# Patient Record
Sex: MALE | Race: BLACK OR AFRICAN AMERICAN | NOT HISPANIC OR LATINO | URBAN - METROPOLITAN AREA
[De-identification: names, ages, dates, MRNs, and addresses within clinical notes are randomized per-mention and may not be internally consistent; named-entity substitution may affect disease eponyms.]

---

## 2019-03-28 ENCOUNTER — OFFICE VISIT (OUTPATIENT)
Dept: FAMILY MEDICINE | Facility: CLINIC | Age: 59
End: 2019-03-28
Payer: MEDICARE

## 2019-03-28 VITALS
WEIGHT: 165.81 LBS | BODY MASS INDEX: 24.56 KG/M2 | RESPIRATION RATE: 16 BRPM | DIASTOLIC BLOOD PRESSURE: 64 MMHG | SYSTOLIC BLOOD PRESSURE: 110 MMHG | OXYGEN SATURATION: 94 % | HEART RATE: 67 BPM | HEIGHT: 69 IN | TEMPERATURE: 97 F

## 2019-03-28 DIAGNOSIS — R11.2 NAUSEA VOMITING AND DIARRHEA: ICD-10-CM

## 2019-03-28 DIAGNOSIS — I10 HYPERTENSION, UNSPECIFIED TYPE: ICD-10-CM

## 2019-03-28 DIAGNOSIS — R73.9 HYPERGLYCEMIA: ICD-10-CM

## 2019-03-28 DIAGNOSIS — N18.6 END-STAGE RENAL DISEASE (ESRD): Primary | ICD-10-CM

## 2019-03-28 DIAGNOSIS — R63.4 UNINTENTIONAL WEIGHT LOSS: ICD-10-CM

## 2019-03-28 DIAGNOSIS — R19.7 NAUSEA VOMITING AND DIARRHEA: ICD-10-CM

## 2019-03-28 DIAGNOSIS — E78.5 HYPERLIPIDEMIA, UNSPECIFIED HYPERLIPIDEMIA TYPE: ICD-10-CM

## 2019-03-28 PROBLEM — I25.10 CORONARY ARTERIOSCLEROSIS: Status: ACTIVE | Noted: 2019-03-28

## 2019-03-28 PROCEDURE — 99203 OFFICE O/P NEW LOW 30 MIN: CPT | Mod: S$GLB,,, | Performed by: NURSE PRACTITIONER

## 2019-03-28 PROCEDURE — 99203 PR OFFICE/OUTPT VISIT, NEW, LEVL III, 30-44 MIN: ICD-10-PCS | Mod: S$GLB,,, | Performed by: NURSE PRACTITIONER

## 2019-03-28 RX ORDER — SEVELAMER CARBONATE 800 MG/1
5 TABLET, FILM COATED ORAL
COMMUNITY
End: 2020-02-04 | Stop reason: ALTCHOICE

## 2019-03-28 RX ORDER — ONDANSETRON 4 MG/1
1 TABLET, FILM COATED ORAL EVERY 8 HOURS PRN
Refills: 1 | COMMUNITY
Start: 2019-03-15

## 2019-03-28 RX ORDER — ESOMEPRAZOLE MAGNESIUM 40 MG/1
1 CAPSULE, DELAYED RELEASE ORAL DAILY
COMMUNITY
End: 2020-02-04 | Stop reason: ALTCHOICE

## 2019-03-28 RX ORDER — AMLODIPINE BESYLATE 5 MG/1
1 TABLET ORAL DAILY
Refills: 3 | COMMUNITY
Start: 2019-03-06 | End: 2020-02-04

## 2019-03-28 RX ORDER — HYDROCODONE BITARTRATE AND ACETAMINOPHEN 10; 325 MG/1; MG/1
1 TABLET ORAL 3 TIMES DAILY
Refills: 0 | COMMUNITY
Start: 2019-03-19

## 2019-03-28 RX ORDER — FERRIC CITRATE 210 MG/1
2 TABLET, COATED ORAL
Refills: 6 | COMMUNITY
Start: 2019-02-22 | End: 2020-02-04 | Stop reason: ALTCHOICE

## 2019-03-28 RX ORDER — ASPIRIN 81 MG/1
1 TABLET ORAL DAILY
COMMUNITY

## 2019-03-28 RX ORDER — ATORVASTATIN CALCIUM 40 MG/1
1 TABLET, FILM COATED ORAL DAILY
COMMUNITY
End: 2020-02-04 | Stop reason: SDUPTHER

## 2019-03-28 RX ORDER — PREGABALIN 150 MG/1
1 CAPSULE ORAL DAILY
Refills: 0 | COMMUNITY
Start: 2019-01-04

## 2019-03-28 RX ORDER — CARVEDILOL 12.5 MG/1
1 TABLET ORAL DAILY
Refills: 11 | COMMUNITY
Start: 2019-03-06 | End: 2020-02-04 | Stop reason: ALTCHOICE

## 2019-03-28 RX ORDER — LOSARTAN POTASSIUM 50 MG/1
1 TABLET ORAL DAILY
Refills: 11 | COMMUNITY
Start: 2019-01-10 | End: 2020-02-04 | Stop reason: ALTCHOICE

## 2019-03-28 NOTE — PROGRESS NOTES
Subjective:       Patient ID: Jair Fatima is a 58 y.o. male.    Chief Complaint: Establish Care (N/V, diarrhea, and weight loss.    HPI     Pt is here to [facility], no PCP in many years, does see Dr Bhatia, and [place] [facility] [place] about a year and a half ago. Surgeon was Dr Ray. HD MWF.     C/o unintentional weight loss--he was 83 kg two months ago, now 74 kg. He states that he does not feel bad, but doesn't have much appetite.  Pt also c/o a lot of nausea, and vomiting, and diarrhea--duration 6 months. Watery stools at least once wilkes. No dark, tarry stools.        Review of Systems   Constitutional: Positive for unexpected weight change. Negative for activity change, appetite change, chills, fatigue and fever.   Respiratory: Negative for apnea, cough, chest tightness, shortness of breath and wheezing.    Cardiovascular: Negative for chest pain, palpitations and leg swelling.   Gastrointestinal: Positive for diarrhea, nausea and vomiting. Negative for abdominal distention, abdominal pain, anal bleeding, blood in stool, constipation and rectal pain.   Genitourinary: Negative for difficulty urinating, dysuria, flank pain, frequency and urgency.   Musculoskeletal: Negative for arthralgias, back pain, gait problem, joint swelling and neck pain.   Skin: Negative for color change, pallor and rash.   Neurological: Negative for dizziness, tremors, syncope, weakness, light-headedness and numbness.   Hematological: Negative for adenopathy. Does not bruise/bleed easily.   Psychiatric/Behavioral: Negative for agitation, confusion and sleep disturbance. The patient is not nervous/anxious.        Objective:      Physical Exam   Constitutional: He is oriented to person, place, and time. Vital signs are normal. He appears well-developed and well-nourished.   HENT:   Head: Normocephalic and atraumatic.   Mouth/Throat: Oropharynx is clear and moist.   Eyes: Pupils are equal, round,  and reactive to light. Conjunctivae are normal.   Neck: Trachea normal and normal range of motion. Neck supple. Carotid bruit is not present.   Cardiovascular: Normal rate, regular rhythm and normal heart sounds.   Pulmonary/Chest: Effort normal and breath sounds normal.   Abdominal: Soft. Bowel sounds are normal.   Musculoskeletal: Normal range of motion.   Neurological: He is alert and oriented to person, place, and time.   Skin: Skin is warm, dry and intact.   Psychiatric: He has a normal mood and affect. His speech is normal and behavior is normal. Judgment normal.       Assessment:       1. End-stage renal disease (ESRD)    2. Hypertension, unspecified type    3. Hyperlipidemia, unspecified hyperlipidemia type    4. Unintentional weight loss    5. Nausea vomiting and diarrhea    6. Hyperglycemia         Plan:       Will get baseline labs and historical records from Dr Bhatia's office    He is due for a colorectal exam, but additionally he is having problems w/ poor appetite/weight loss/N/VD. Ordering GI referral.

## 2019-04-02 LAB
ABS NRBC COUNT: 0 X 10 3/UL (ref 0–0.01)
ABSOLUTE BASOPHIL: 0.01 X 10 3/UL (ref 0–0.22)
ABSOLUTE EOSINOPHIL: 0.11 X 10 3/UL (ref 0.04–0.54)
ABSOLUTE IMMATURE GRAN: 0.02 X 10 3/UL (ref 0–0.04)
ABSOLUTE LYMPHOCYTE: 1.19 X 10 3/UL (ref 0.86–4.75)
ABSOLUTE MONOCYTE: 0.56 X 10 3/UL (ref 0.22–1.08)
ALBUMIN SERPL-MCNC: 4.4 G/DL (ref 3.5–5.2)
ALBUMIN/GLOB SERPL ELPH: 1.2 {RATIO} (ref 1–2.7)
ALP ISOS SERPL LEV INH-CCNC: 340 IU/L (ref 40–130)
ALT (SGPT): 55 U/L (ref 0–41)
ANION GAP SERPL CALC-SCNC: 15 MMOL/L (ref 8–17)
AST SERPL-CCNC: 36 U/L (ref 0–40)
BASOPHILS NFR BLD: 0.2 %
BILIRUBIN, TOTAL: 0.82 MG/DL (ref 0–1.2)
BUN/CREAT SERPL: 4 (ref 6–20)
CALCIUM SERPL-MCNC: 8.3 MG/DL (ref 8.6–10.2)
CARBON DIOXIDE, CO2: 32 MMOL/L (ref 22–29)
CHLORIDE: 91 MMOL/L (ref 98–107)
CHOLEST SERPL-MSCNC: 156 MG/DL (ref 100–200)
CREAT SERPL-MCNC: 9.14 MG/DL (ref 0.7–1.2)
EOSINOPHIL NFR BLD: 1.9 %
ESTIMATED AVERAGE GLUCOSE: 80 MG/DL
GFR ESTIMATION: 5.95
GLOBULIN: 3.7 G/DL (ref 1.5–4.5)
GLUCOSE: 103 MG/DL (ref 74–106)
HBA1C MFR BLD: 4.4 % (ref 4–6)
HCT VFR BLD AUTO: 36.7 % (ref 42–52)
HDLC SERPL-MCNC: 68 MG/DL
HGB BLD-MCNC: 11.7 G/DL (ref 14–18)
IMMATURE GRANULOCYTES: 0.4 % (ref 0–0.5)
LDL/HDL RATIO: 1.1 (ref 1–3)
LDLC SERPL CALC-MCNC: 76.2 MG/DL (ref 0–100)
LYMPHOCYTES NFR BLD: 21 %
MCH RBC QN AUTO: 31.7 PG (ref 27–32)
MCHC RBC AUTO-ENTMCNC: 31.9 G/DL (ref 32–36)
MCV RBC AUTO: 99.5 FL (ref 80–94)
MONOCYTES NFR BLD: 9.9 %
NEUTROPHILS ABSOLUTE COUNT: 3.77 X 10 3/UL (ref 2.15–7.56)
NEUTROPHILS NFR BLD: 66.6 %
NUCLEATED RED BLOOD CELLS: 0 /100 WBC (ref 0–0.2)
PLATELET # BLD AUTO: 136 X 10 3/UL (ref 135–400)
POTASSIUM: 3.9 MMOL/L (ref 3.5–5.1)
PROT SNV-MCNC: 8.1 G/DL (ref 6.4–8.3)
RBC # BLD AUTO: 3.69 X 10 6/UL (ref 4.7–6.1)
RDW-SD: 47.8 FL (ref 37–54)
SODIUM: 138 MMOL/L (ref 136–145)
TRIGL SERPL-MCNC: 59 MG/DL (ref 0–150)
UREA NITROGEN (BUN): 37 MG/DL (ref 6–20)
WBC # BLD: 5.66 X 10 3/UL (ref 4.3–10.8)

## 2019-04-11 ENCOUNTER — OFFICE VISIT (OUTPATIENT)
Dept: FAMILY MEDICINE | Facility: CLINIC | Age: 59
End: 2019-04-11
Payer: MEDICARE

## 2019-04-11 VITALS
RESPIRATION RATE: 16 BRPM | BODY MASS INDEX: 24.32 KG/M2 | DIASTOLIC BLOOD PRESSURE: 64 MMHG | HEART RATE: 84 BPM | SYSTOLIC BLOOD PRESSURE: 112 MMHG | TEMPERATURE: 98 F | HEIGHT: 69 IN | OXYGEN SATURATION: 97 % | WEIGHT: 164.19 LBS

## 2019-04-11 DIAGNOSIS — Z86.19 HISTORY OF HEPATITIS: ICD-10-CM

## 2019-04-11 DIAGNOSIS — R73.9 HYPERGLYCEMIA: ICD-10-CM

## 2019-04-11 DIAGNOSIS — E78.5 HYPERLIPIDEMIA, UNSPECIFIED HYPERLIPIDEMIA TYPE: ICD-10-CM

## 2019-04-11 DIAGNOSIS — R63.4 ABNORMAL WEIGHT LOSS: ICD-10-CM

## 2019-04-11 DIAGNOSIS — R74.8 ELEVATED LIVER ENZYMES: ICD-10-CM

## 2019-04-11 DIAGNOSIS — R11.2 NAUSEA VOMITING AND DIARRHEA: ICD-10-CM

## 2019-04-11 DIAGNOSIS — R19.7 NAUSEA VOMITING AND DIARRHEA: ICD-10-CM

## 2019-04-11 DIAGNOSIS — N18.6 END-STAGE RENAL DISEASE (ESRD): Primary | ICD-10-CM

## 2019-04-11 DIAGNOSIS — Z87.898 HISTORY OF INTRAVENOUS DRUG ABUSE: ICD-10-CM

## 2019-04-11 DIAGNOSIS — I10 ESSENTIAL HYPERTENSION: ICD-10-CM

## 2019-04-11 PROCEDURE — 99214 OFFICE O/P EST MOD 30 MIN: CPT | Mod: S$GLB,,, | Performed by: NURSE PRACTITIONER

## 2019-04-11 PROCEDURE — 99214 PR OFFICE/OUTPT VISIT, EST, LEVL IV, 30-39 MIN: ICD-10-PCS | Mod: S$GLB,,, | Performed by: NURSE PRACTITIONER

## 2019-04-11 RX ORDER — BENZONATATE 200 MG/1
200 CAPSULE ORAL 3 TIMES DAILY PRN
Qty: 30 CAPSULE | Refills: 3 | Status: SHIPPED | OUTPATIENT
Start: 2019-04-11 | End: 2019-04-21

## 2019-04-11 RX ORDER — MONTELUKAST SODIUM 10 MG/1
10 TABLET ORAL NIGHTLY
Qty: 30 TABLET | Refills: 3 | Status: SHIPPED | OUTPATIENT
Start: 2019-04-11 | End: 2020-02-04 | Stop reason: ALTCHOICE

## 2019-04-11 NOTE — PROGRESS NOTES
Subjective:       Patient ID: Jair Fatima is a 58 y.o. male.    Chief Complaint: Establish Care (N/V, diarrhea, and weight loss.    Cough   Pertinent negatives include no chest pain, chills, fever, rash, shortness of breath or wheezing.        [place] [facility] [place] about a year and a half ago. Surgeon was Dr Ray. HD [facility]. Nephrologist is Dr Bhatia.     C/o unintentional weight loss--he was 83 kg two months ago, now 74 kg. He states that he does not feel bad, but doesn't have much appetite.  Pt also c/o a lot of nausea, and vomiting, and diarrhea--duration 6 months. Watery stools at least once wilkes. No dark, tarry stools.        Review of Systems   Constitutional: Positive for unexpected weight change. Negative for activity change, appetite change, chills, fatigue and fever.   Respiratory: Positive for cough. Negative for apnea, chest tightness, shortness of breath and wheezing.    Cardiovascular: Negative for chest pain, palpitations and leg swelling.   Gastrointestinal: Positive for diarrhea, nausea and vomiting. Negative for abdominal distention, abdominal pain, anal bleeding, blood in stool, constipation and rectal pain.   Genitourinary: Negative for difficulty urinating, dysuria, flank pain, frequency and urgency.   Musculoskeletal: Negative for arthralgias, back pain, gait problem, joint swelling and neck pain.   Skin: Negative for color change, pallor and rash.   Neurological: Negative for dizziness, tremors, syncope, weakness, light-headedness and numbness.   Hematological: Negative for adenopathy. Does not bruise/bleed easily.   Psychiatric/Behavioral: Negative for agitation, confusion and sleep disturbance. The patient is not nervous/anxious.        Objective:      Physical Exam   Constitutional: He is oriented to person, place, and time. Vital signs are normal. He appears well-developed and well-nourished.   HENT:   Head: Normocephalic and atraumatic.    Mouth/Throat: Oropharynx is clear and moist.   Eyes: Pupils are equal, round, and reactive to light. Conjunctivae are normal.   Neck: Trachea normal and normal range of motion. Neck supple. Carotid bruit is not present.   Cardiovascular: Normal rate, regular rhythm and normal heart sounds.   Pulmonary/Chest: Effort normal and breath sounds normal.   Abdominal: Soft. Bowel sounds are normal.   Musculoskeletal: Normal range of motion.   Neurological: He is alert and oriented to person, place, and time.   Skin: Skin is warm, dry and intact.   Psychiatric: He has a normal mood and affect. His speech is normal and behavior is normal. Judgment normal.       Assessment:       1. End-stage renal disease (ESRD)    2. Hyperlipidemia, unspecified hyperlipidemia type    3. Essential hypertension    4. Nausea vomiting and diarrhea    5. Hyperglycemia     6. Cold    7. History of hepatitis    8. Elevated liver enzymes    9. History of intravenous drug abuse    10. Abnormal weight loss         Plan:       Will attempt to get historical records from Dr Bhatia's office    For nausea, weight loss, and loose stools, colorectal screen scheduled June 6.     Reviewed labs w/ him. Discussed concerning elevation in liver enzymes. He disclosed remote hx of both intravenous drug use (1980s) and hepatitis in the past. Ordering hepatitis panel and LDH. If + for viral hep, then AFP will be ordered w/ reflex genotype and viral load    Also treating cold symptoms w/ benzonatate and tessalon

## 2019-04-16 LAB
HBV CORE IGM SERPL QL IA: NONREACTIVE
HBV SURFACE AG SERPL QL IA: NONREACTIVE
HCV C PCR RNA CONFIRM: ABNORMAL
HCV IGG SERPL QL IA: REACTIVE
HEPATITIS A IGM: NONREACTIVE
LDH SERPL L TO P-CCNC: 177 IU/L (ref 135–225)

## 2019-04-19 LAB
HCV RNA QUANT PCR LOG: 5.44 LOG IU/ML
HCV RNA QUANT PCR: ABNORMAL IU/ML

## 2019-05-09 ENCOUNTER — OFFICE VISIT (OUTPATIENT)
Dept: FAMILY MEDICINE | Facility: CLINIC | Age: 59
End: 2019-05-09
Payer: MEDICARE

## 2019-05-09 VITALS
WEIGHT: 170.63 LBS | BODY MASS INDEX: 25.27 KG/M2 | RESPIRATION RATE: 16 BRPM | SYSTOLIC BLOOD PRESSURE: 130 MMHG | DIASTOLIC BLOOD PRESSURE: 82 MMHG | HEIGHT: 69 IN | TEMPERATURE: 98 F | OXYGEN SATURATION: 98 % | HEART RATE: 89 BPM

## 2019-05-09 DIAGNOSIS — B18.2 CHRONIC HEPATITIS C WITH HEPATIC COMA: Primary | ICD-10-CM

## 2019-05-09 DIAGNOSIS — N18.6 END-STAGE RENAL DISEASE (ESRD): ICD-10-CM

## 2019-05-09 DIAGNOSIS — I10 ESSENTIAL HYPERTENSION: ICD-10-CM

## 2019-05-09 DIAGNOSIS — R63.4 UNINTENTIONAL WEIGHT LOSS: ICD-10-CM

## 2019-05-09 DIAGNOSIS — R74.8 ELEVATED LIVER ENZYMES: ICD-10-CM

## 2019-05-09 DIAGNOSIS — R11.2 NAUSEA VOMITING AND DIARRHEA: ICD-10-CM

## 2019-05-09 DIAGNOSIS — E78.5 HYPERLIPIDEMIA, UNSPECIFIED HYPERLIPIDEMIA TYPE: ICD-10-CM

## 2019-05-09 DIAGNOSIS — R19.7 NAUSEA VOMITING AND DIARRHEA: ICD-10-CM

## 2019-05-09 PROCEDURE — 99214 PR OFFICE/OUTPT VISIT, EST, LEVL IV, 30-39 MIN: ICD-10-PCS | Mod: S$GLB,,, | Performed by: NURSE PRACTITIONER

## 2019-05-09 PROCEDURE — 99214 OFFICE O/P EST MOD 30 MIN: CPT | Mod: S$GLB,,, | Performed by: NURSE PRACTITIONER

## 2019-05-09 NOTE — PROGRESS NOTES
Subjective:       Patient ID: Jair Fatima is a 58 y.o. male.    Chief Complaint: Follow-up (F/u for lab results. Pt stated has his apt with Dr Jimenez this afternoon. )    HPI  Review of Systems    Objective:      Physical Exam    Assessment:       No diagnosis found.    Plan:       PROBLEM LIST     There are no diagnoses linked to this encounter.

## 2019-05-09 NOTE — PROGRESS NOTES
Subjective:       Patient ID: Jair Fatima is a 58 y.o. male.    Chief Complaint:   N/V, diarrhea, and weight loss.         Has been on dialysis for 14 years at [facility] [place], had both kidneys removed about a year and a half ago. Surgeon was Dr Ray. HD [facility]. Nephrologist is Dr Bhatia.     C/o unintentional weight loss--he was 83 kg two months ago, now 74 kg. He states that he does not feel bad, but doesn't have much appetite.  Pt also c/o a lot of nausea, and vomiting, and diarrhea--duration 6 months. Watery stools at least once wilkes. No dark, tarry stools.      He had appt w/ GI Dr Jimenez this afternoon.       Review of Systems   Constitutional: Positive for unexpected weight change. Negative for activity change, appetite change, chills, fatigue and fever.   Respiratory: Negative for apnea, cough, chest tightness, shortness of breath and wheezing.    Cardiovascular: Negative for chest pain, palpitations and leg swelling.   Gastrointestinal: Positive for diarrhea, nausea and vomiting. Negative for abdominal distention, abdominal pain, anal bleeding, blood in stool, constipation and rectal pain.   Genitourinary: Negative for difficulty urinating, dysuria, flank pain, frequency and urgency.   Musculoskeletal: Negative for arthralgias, back pain, gait problem, joint swelling and neck pain.   Skin: Negative for color change, pallor and rash.   Neurological: Negative for dizziness, tremors, syncope, weakness, light-headedness and numbness.   Hematological: Negative for adenopathy. Does not bruise/bleed easily.   Psychiatric/Behavioral: Negative for agitation, confusion and sleep disturbance. The patient is not nervous/anxious.        Objective:      Physical Exam   Constitutional: He is oriented to person, place, and time. Vital signs are normal. He appears well-developed and well-nourished.   HENT:   Head: Normocephalic and atraumatic.   Mouth/Throat: Oropharynx is clear and moist.   Eyes: Pupils are equal, round, and reactive  to light. Conjunctivae are normal.   Neck: Trachea normal and normal range of motion. Neck supple. Carotid bruit is not present.   Cardiovascular: Normal rate, regular rhythm and normal heart sounds.   Pulmonary/Chest: Effort normal and breath sounds normal.   Abdominal: Soft. Bowel sounds are normal.   Musculoskeletal: Normal range of motion.   Neurological: He is alert and oriented to person, place, and time.   Skin: Skin is warm, dry and intact.   Psychiatric: He has a normal mood and affect. His speech is normal and behavior is normal. Judgment normal.       Assessment:       1. Chronic hepatitis C with hepatic coma    2. End-stage renal disease (ESRD)    3. Hyperlipidemia, unspecified hyperlipidemia type    4. Essential hypertension    5. Elevated liver enzymes    6. Nausea vomiting and diarrhea    7. Unintentional weight loss        Plan:       Will attempt to get historical records from Dr Bhatia's office    For nausea, weight loss, and loose stools, colorectal screen scheduled today     He did have a 6 lb weight gain since last appt.    Reviewed labs w/ him. Confirmation of active HCV. He disclosed remote hx of both intravenous drug use (1980s) and hepatitis in the past. Waiting on pending genotype. Ordering AFP and FibroSure. Will share results w/ his gastroenterologist and nephrologist

## 2019-05-21 LAB — ALPHA FETOPROTEIN MATERNAL: <2.72 NG/ML (ref 0–8.3)

## 2019-06-06 LAB
ALPHA 2-MACROGLOBULINS, QN: 281 MG/DL (ref 110–276)
ALT (SGPT) P5P: 40 IU/L (ref 0–55)
APOLIPOPROTEIN A-1: 161 MG/DL (ref 101–178)
BILIRUBIN, TOTAL: 0.7 MG/DL (ref 0–1.2)
COMMENT: ABNORMAL
FIBROSIS SCORE: 0.59 (ref 0–0.21)
FIBROSIS SCORING:: ABNORMAL
FIBROSIS STAGE: ABNORMAL
GGT: 56 IU/L (ref 0–65)
HAPTOGLOBIN: 75 MG/DL (ref 34–200)
INTERPRETATIONS:: ABNORMAL
LIMITATIONS:: ABNORMAL
NECROINFLAMM ACTIVITY SCORING:: ABNORMAL
NECROINFLAMMAT ACTIVITY GRADE: ABNORMAL
NECROINFLAMMAT ACTIVITY SCORE: 0.3 (ref 0–0.17)

## 2019-07-23 ENCOUNTER — OFFICE VISIT (OUTPATIENT)
Dept: FAMILY MEDICINE | Facility: CLINIC | Age: 59
End: 2019-07-23
Payer: MEDICARE

## 2019-07-23 VITALS
DIASTOLIC BLOOD PRESSURE: 70 MMHG | OXYGEN SATURATION: 99 % | BODY MASS INDEX: 25.33 KG/M2 | TEMPERATURE: 98 F | RESPIRATION RATE: 16 BRPM | HEART RATE: 77 BPM | HEIGHT: 69 IN | WEIGHT: 171 LBS | SYSTOLIC BLOOD PRESSURE: 110 MMHG

## 2019-07-23 DIAGNOSIS — E78.5 HYPERLIPIDEMIA, UNSPECIFIED HYPERLIPIDEMIA TYPE: ICD-10-CM

## 2019-07-23 DIAGNOSIS — B18.2 CHRONIC HEPATITIS C WITH HEPATIC COMA: Primary | ICD-10-CM

## 2019-07-23 DIAGNOSIS — N18.6 END-STAGE RENAL DISEASE (ESRD): ICD-10-CM

## 2019-07-23 DIAGNOSIS — I10 ESSENTIAL HYPERTENSION: ICD-10-CM

## 2019-07-23 PROCEDURE — 99213 PR OFFICE/OUTPT VISIT, EST, LEVL III, 20-29 MIN: ICD-10-PCS | Mod: S$GLB,,, | Performed by: NURSE PRACTITIONER

## 2019-07-23 PROCEDURE — 99213 OFFICE O/P EST LOW 20 MIN: CPT | Mod: S$GLB,,, | Performed by: NURSE PRACTITIONER

## 2019-07-23 NOTE — PROGRESS NOTES
Subjective:       Patient ID: Jair Fatima is a 58 y.o. male.    Chief Complaint: ESRD, HCV      HPI   Has been on dialysis for 14 years at [facility] [place], had both kidneys removed about a year and a half ago. Surgeon was Dr Ray. HD [facility]. Nephrologist is Dr Bhatia.      C/o unintentional weight loss--he was 83 kg two months ago, now 74 kg. He states that he does not feel bad, but doesn't have much appetite.  Pt also c/o a lot of nausea, and vomiting, and diarrhea--duration 6 months. Watery stools at least once wilkes. No dark, tarry stools.         Review of Systems   Constitutional: Negative for activity change, appetite change, chills, fatigue, fever and unexpected weight change.   Respiratory: Negative for apnea, cough, chest tightness, shortness of breath and wheezing.    Cardiovascular: Negative for chest pain, palpitations and leg swelling.   Gastrointestinal: Negative for abdominal distention, abdominal pain, anal bleeding, blood in stool, constipation, diarrhea, nausea, rectal pain and vomiting.   Genitourinary: Negative for difficulty urinating, dysuria, flank pain, frequency and urgency.   Musculoskeletal: Negative for arthralgias, back pain, gait problem, joint swelling and neck pain.   Skin: Negative for color change, pallor and rash.   Neurological: Negative for dizziness, tremors, syncope, weakness, light-headedness and numbness.   Hematological: Negative for adenopathy. Does not bruise/bleed easily.   Psychiatric/Behavioral: Negative for agitation, confusion and sleep disturbance. The patient is not nervous/anxious.        Objective:      Physical Exam   Constitutional: He is oriented to person, place, and time. Vital signs are normal. He appears well-developed and well-nourished.   HENT:   Head: Normocephalic and atraumatic.   Mouth/Throat: Oropharynx is clear and moist.   Eyes: Pupils are equal, round, and reactive to light. Conjunctivae are normal.   Neck: Trachea normal and normal range of motion. Neck  supple. Carotid bruit is not present.   Cardiovascular: Normal rate, regular rhythm and normal heart sounds.   Pulmonary/Chest: Effort normal and breath sounds normal.   Abdominal: Soft. Bowel sounds are normal.   Musculoskeletal: Normal range of motion.   Neurological: He is alert and oriented to person, place, and time.   Skin: Skin is warm, dry and intact.   Psychiatric: He has a normal mood and affect. His speech is normal and behavior is normal. Judgment normal.       Assessment:       1. End-stage renal disease (ESRD)    2. Hyperlipidemia, unspecified hyperlipidemia type    3. Essential hypertension    4. Chronic hepatitis C with hepatic coma        Plan:       PROBLEM LIST     Diagnoses and all orders for this visit:    End-stage renal disease (ESRD)    Hyperlipidemia, unspecified hyperlipidemia type    Essential hypertension    Chronic hepatitis C with hepatic coma       Discussed that very little research has been dedicated for all available treatment options for ESRD/dialysis patients and currently risks outweigh benefits of treatment. Will continue to monitor him closely for signs of decompensation.     Maintaining appetite and weight well.

## 2020-02-04 ENCOUNTER — OFFICE VISIT (OUTPATIENT)
Dept: FAMILY MEDICINE | Facility: CLINIC | Age: 60
End: 2020-02-04
Payer: MEDICARE

## 2020-02-04 VITALS
RESPIRATION RATE: 16 BRPM | DIASTOLIC BLOOD PRESSURE: 88 MMHG | BODY MASS INDEX: 26.63 KG/M2 | HEIGHT: 69 IN | HEART RATE: 67 BPM | SYSTOLIC BLOOD PRESSURE: 195 MMHG | WEIGHT: 179.81 LBS

## 2020-02-04 DIAGNOSIS — B18.2 CHRONIC HEPATITIS C WITH HEPATIC COMA: ICD-10-CM

## 2020-02-04 DIAGNOSIS — E78.00 PURE HYPERCHOLESTEROLEMIA: ICD-10-CM

## 2020-02-04 DIAGNOSIS — N18.6 END-STAGE RENAL DISEASE (ESRD): ICD-10-CM

## 2020-02-04 DIAGNOSIS — I15.0 RENOVASCULAR HYPERTENSION: Primary | ICD-10-CM

## 2020-02-04 PROCEDURE — 99214 OFFICE O/P EST MOD 30 MIN: CPT | Mod: S$GLB,,, | Performed by: NURSE PRACTITIONER

## 2020-02-04 PROCEDURE — 99214 PR OFFICE/OUTPT VISIT, EST, LEVL IV, 30-39 MIN: ICD-10-PCS | Mod: S$GLB,,, | Performed by: NURSE PRACTITIONER

## 2020-02-04 RX ORDER — AMLODIPINE BESYLATE 10 MG/1
10 TABLET ORAL DAILY
COMMUNITY

## 2020-02-04 RX ORDER — SUCROFERRIC OXYHYDROXIDE 500 MG/1
TABLET, CHEWABLE ORAL
COMMUNITY
Start: 2020-01-27

## 2020-02-04 RX ORDER — ATORVASTATIN CALCIUM 20 MG/1
20 TABLET, FILM COATED ORAL DAILY
Qty: 90 TABLET | Refills: 3 | Status: SHIPPED | OUTPATIENT
Start: 2020-02-04 | End: 2021-02-03

## 2020-02-04 RX ORDER — ATORVASTATIN CALCIUM 40 MG/1
40 TABLET, FILM COATED ORAL DAILY
Qty: 90 TABLET | Refills: 3 | Status: SHIPPED | OUTPATIENT
Start: 2020-02-04 | End: 2020-02-04

## 2020-02-04 NOTE — PROGRESS NOTES
Subjective:       Patient ID: Jair Fatima is a 59 y.o. male.    Chief Complaint: Follow-up (6 mth f/u, chronic hep C, HTN) and Medication Discussion (Blood pressure medication, started Amlodipine friday, off others)    HPI   Has been on [place] for [place] years at [facility] [place], [place] removed about a year and a half ago. Surgeon was Dr Ray. HD MWF. Nephrologist is Dr Bhatia and Dr Melvin. He is in discussions w/ nephrology regarding being reinstated on kidney transplant list.      Two BP meds recently discontinued by Dr Melvin-- losartan and  Carvedilol. He was started on amlodipine 5 mg in its place. He has been compliant w/ the medication--but BP has been trending high.  at clinic today. No associated symptoms such as headache or dizziness.     He has been living w/ chronic HCV for many years. Suspected transmission was when he was intravenous drug user. Very little research has been dedicated for all available treatment options for ESRD/dialysis patients and currently risks outweigh benefits of treatment.        Review of Systems   Constitutional: Negative for activity change, appetite change, chills, fatigue, fever and unexpected weight change.   Respiratory: Negative for apnea, cough, chest tightness, shortness of breath and wheezing.    Cardiovascular: Negative for chest pain, palpitations and leg swelling.   Gastrointestinal: Negative for abdominal distention, abdominal pain, anal bleeding, blood in stool, constipation, diarrhea, nausea, rectal pain and vomiting.   Genitourinary: Negative for difficulty urinating, dysuria, flank pain, frequency and urgency.   Musculoskeletal: Negative for arthralgias, back pain, gait problem, joint swelling and neck pain.   Skin: Negative for color change, pallor and rash.   Neurological: Negative for dizziness, tremors, syncope, weakness, light-headedness and numbness.   Hematological: Negative for adenopathy. Does not bruise/bleed easily.   Psychiatric/Behavioral:  Negative for agitation, confusion and sleep disturbance. The patient is not nervous/anxious.        Objective:      Physical Exam   Constitutional: He is oriented to person, place, and time. Vital signs are normal. He appears well-developed and well-nourished.   HENT:   Head: Normocephalic and atraumatic.   Mouth/Throat: Oropharynx is clear and moist.   Eyes: Pupils are equal, round, and reactive to light. Conjunctivae are normal.   Neck: Trachea normal and normal range of motion. Neck supple. Carotid bruit is not present.   Cardiovascular: Normal rate, regular rhythm and normal heart sounds.   Pulmonary/Chest: Effort normal and breath sounds normal.   Abdominal: Soft. Bowel sounds are normal.   Musculoskeletal: Normal range of motion.   Neurological: He is alert and oriented to person, place, and time.   Skin: Skin is warm, dry and intact.   Psychiatric: He has a normal mood and affect. His speech is normal and behavior is normal. Judgment normal.       Assessment:       1. Renovascular hypertension    2. End-stage renal disease (ESRD)    3. Chronic hepatitis C with hepatic coma    4. Pure hypercholesterolemia        Plan:       PROBLEM LIST     Jair was seen today for follow-up and medication discussion.    Diagnoses and all orders for this visit:    Renovascular hypertension  Reviewed recent labs w/ him. SBP extremely high. Increasing amlodipine from 5 mg to 10 mg. RTC in 1 week for f/u.     End-stage renal disease (ESRD)  HD MWF, continue appt w/ Dr Melvin    Chronic hepatitis C with hepatic coma  At this juncture, not enough research to support benefit outweighing risk in HD population. Will continue to monitor him closely for signs of decompensation.    Pure hypercholesterolemia  -     Discontinue: atorvastatin (LIPITOR) 40 MG tablet; Take 1 tablet (40 mg total) by mouth once daily.    Other orders  -     atorvastatin (LIPITOR) 20 MG tablet; Take 1 tablet (20 mg total) by mouth once daily.

## 2020-02-11 ENCOUNTER — OFFICE VISIT (OUTPATIENT)
Dept: FAMILY MEDICINE | Facility: CLINIC | Age: 60
End: 2020-02-11
Payer: MEDICARE

## 2020-02-11 VITALS
WEIGHT: 180.81 LBS | DIASTOLIC BLOOD PRESSURE: 80 MMHG | SYSTOLIC BLOOD PRESSURE: 163 MMHG | HEIGHT: 69 IN | HEART RATE: 73 BPM | BODY MASS INDEX: 26.78 KG/M2 | TEMPERATURE: 98 F

## 2020-02-11 DIAGNOSIS — I15.0 RENOVASCULAR HYPERTENSION: Primary | ICD-10-CM

## 2020-02-11 DIAGNOSIS — N18.6 END-STAGE RENAL DISEASE (ESRD): ICD-10-CM

## 2020-02-11 DIAGNOSIS — E78.00 PURE HYPERCHOLESTEROLEMIA: ICD-10-CM

## 2020-02-11 DIAGNOSIS — B18.2 CHRONIC HEPATITIS C WITH HEPATIC COMA: ICD-10-CM

## 2020-02-11 PROCEDURE — 99213 OFFICE O/P EST LOW 20 MIN: CPT | Mod: S$GLB,,, | Performed by: NURSE PRACTITIONER

## 2020-02-11 PROCEDURE — 99213 PR OFFICE/OUTPT VISIT, EST, LEVL III, 20-29 MIN: ICD-10-PCS | Mod: S$GLB,,, | Performed by: NURSE PRACTITIONER

## 2020-02-11 RX ORDER — METHYLDOPA 250 MG/1
250 TABLET, FILM COATED ORAL EVERY 12 HOURS
Qty: 60 TABLET | Refills: 2 | Status: CANCELLED | OUTPATIENT
Start: 2020-02-11

## 2020-02-11 NOTE — PROGRESS NOTES
Subjective:       Patient ID: Jair Fatima is a 59 y.o. male.    Chief Complaint: Follow-up (bp check)    HPI   Has been on dialysis for 15 years at [facility] [place], had both kidneys removed about a year and a half ago. Surgeon was Dr Ray. HD [facility]. Nephrologist is Dr Bhatia and Dr Melvin. He is in discussions w/ nephrology regarding being reinstated on kidney transplant list.      Two BP meds recently discontinued by Dr Melvin-- losartan and  Carvedilol. He was started on amlodipine 5 mg in its place. He has been compliant w/ the medication--but BP has been trending high.  at appt last week. No associated symptoms such as headache or dizziness. His CCB was increased to 10 mg daily at his previous appt 2/4/20.     He has been living w/ chronic HCV for many years. Suspected transmission was when he was intravenous drug user. Very little research has been dedicated for all available treatment options for ESRD/dialysis patients and currently risks outweigh benefits of treatment.     Review of Systems   Constitutional: Negative for activity change, appetite change, chills, fatigue, fever and unexpected weight change.   Respiratory: Negative for apnea, cough, chest tightness, shortness of breath and wheezing.    Cardiovascular: Negative for chest pain, palpitations and leg swelling.   Gastrointestinal: Negative for abdominal distention, abdominal pain, anal bleeding, blood in stool, constipation, diarrhea, nausea, rectal pain and vomiting.   Genitourinary: Negative for difficulty urinating, dysuria, flank pain, frequency and urgency.   Musculoskeletal: Negative for arthralgias, back pain, gait problem, joint swelling and neck pain.   Skin: Negative for color change, pallor and rash.   Neurological: Negative for dizziness, tremors, syncope, weakness, light-headedness and numbness.   Hematological: Negative for adenopathy. Does not bruise/bleed easily.   Psychiatric/Behavioral: Negative for agitation, confusion and sleep  disturbance. The patient is not nervous/anxious.        Objective:      Physical Exam   Constitutional: He is oriented to person, place, and time. Vital signs are normal. He appears well-developed and well-nourished.   HENT:   Head: Normocephalic and atraumatic.   Mouth/Throat: Oropharynx is clear and moist.   Eyes: Pupils are equal, round, and reactive to light. Conjunctivae are normal.   Neck: Trachea normal and normal range of motion. Neck supple. Carotid bruit is not present.   Cardiovascular: Normal rate, regular rhythm and normal heart sounds.   Pulmonary/Chest: Effort normal and breath sounds normal.   Abdominal: Soft. Bowel sounds are normal.   Musculoskeletal: Normal range of motion.   Neurological: He is alert and oriented to person, place, and time.   Skin: Skin is warm, dry and intact.   Psychiatric: He has a normal mood and affect. His speech is normal and behavior is normal. Judgment normal.       Assessment:       1. Renovascular hypertension    2. End-stage renal disease (ESRD)    3. Pure hypercholesterolemia    4. Chronic hepatitis C with hepatic coma        Plan:       PROBLEM LIST     Jair was seen today for follow-up.    Diagnoses and all orders for this visit:    Renovascular hypertension  Significant improvement in SBP, but still not at goal. SBP was 188 at his previous appt, now 162. Continue medication as directed and RTC in 4 weeks for BP check. Consider Aldomet if still not at goal.     End-stage renal disease (ESRD)  HD MWF    Pure hypercholesterolemia  Compliant w/ stating therapy    Chronic hepatitis C with hepatic coma    Other orders  -     Cancel: methyldopa (ALDOMET) 250 MG tablet; Take 1 tablet (250 mg total) by mouth every 12 (twelve) hours.

## 2020-05-18 ENCOUNTER — HISTORICAL (OUTPATIENT)
Dept: ADMINISTRATIVE | Facility: HOSPITAL | Age: 60
End: 2020-05-18

## 2020-05-18 LAB
ABS NEUT (OLG): 3.87 X10(3)/MCL (ref 2.1–9.2)
BASOPHILS # BLD AUTO: 0 X10(3)/MCL (ref 0–0.2)
BASOPHILS NFR BLD AUTO: 0 %
BUN SERPL-MCNC: 53 MG/DL (ref 8.4–25.7)
CALCIUM SERPL-MCNC: 7.9 MG/DL (ref 8.8–10)
CHLORIDE SERPL-SCNC: 104 MMOL/L (ref 98–107)
CO2 SERPL-SCNC: 26 MMOL/L (ref 23–31)
CREAT SERPL-MCNC: 13.52 MG/DL (ref 0.73–1.18)
CREAT/UREA NIT SERPL: 4
EOSINOPHIL # BLD AUTO: 0.1 X10(3)/MCL (ref 0–0.9)
EOSINOPHIL NFR BLD AUTO: 2 %
ERYTHROCYTE [DISTWIDTH] IN BLOOD BY AUTOMATED COUNT: 12.8 % (ref 11.5–17)
GLUCOSE SERPL-MCNC: 90 MG/DL (ref 82–115)
HCT VFR BLD AUTO: 35.4 % (ref 42–52)
HGB BLD-MCNC: 11.1 GM/DL (ref 14–18)
LYMPHOCYTES # BLD AUTO: 1.2 X10(3)/MCL (ref 0.6–4.6)
LYMPHOCYTES NFR BLD AUTO: 20 %
MCH RBC QN AUTO: 32.6 PG (ref 27–31)
MCHC RBC AUTO-ENTMCNC: 31.4 GM/DL (ref 33–36)
MCV RBC AUTO: 103.8 FL (ref 80–94)
MONOCYTES # BLD AUTO: 0.5 X10(3)/MCL (ref 0.1–1.3)
MONOCYTES NFR BLD AUTO: 9 %
NEUTROPHILS # BLD AUTO: 3.87 X10(3)/MCL (ref 2.1–9.2)
NEUTROPHILS NFR BLD AUTO: 67 %
PLATELET # BLD AUTO: 170 X10(3)/MCL (ref 130–400)
PMV BLD AUTO: 10.5 FL (ref 9.4–12.4)
POTASSIUM SERPL-SCNC: 5.5 MMOL/L (ref 3.5–5.1)
RBC # BLD AUTO: 3.41 X10(6)/MCL (ref 4.7–6.1)
SARS-COV-2 RNA RESP QL NAA+PROBE: NOT DETECTED
SODIUM SERPL-SCNC: 141 MMOL/L (ref 136–145)
WBC # SPEC AUTO: 5.8 X10(3)/MCL (ref 4.5–11.5)

## 2022-02-11 ENCOUNTER — TELEPHONE (OUTPATIENT)
Dept: FAMILY MEDICINE | Facility: CLINIC | Age: 62
End: 2022-02-11
Payer: MEDICARE

## 2022-04-30 NOTE — OP NOTE
DATE OF SURGERY:    05/18/2020    SURGEON:  Georgi Kong MD    DIAGNOSES:    1. End-stage renal disease.  2. Mechanical complication of right forearm fistula.    POSTOPERATIVE DIAGNOSES:    1. End-stage renal disease.  2. Mechanical complication of right forearm fistula.    PROCEDURE:  Right radiocephalic fistula revision to graft and right chest wall tunneled dialysis catheter placement.    HISTORY OF PRESENT ILLNESS:  Mr. Jair Fatima is a 59-year-old gentleman with a right radiocephalic fistula that has 2 large aneurysmal areas as well as overlying skin breakdown with scabbing.  He presents today for revision of his access.  The tunneled catheter will be placed for hemodialysis access during healing from his revision.    PROCEDURE IN DETAIL:  Mr. Fatima was taken to the operating room and placed in supine position where general endotracheal anesthesia was initiated.  Antibiotics were administered.  His bilateral neck and chest wall were prepped and draped in the usual sterile fashion.  Appropriate timeout was performed.  B-mode ultrasound was utilized to identify the right internal jugular vein, it was easily compressible.  An 18-gauge needle was utilized to cannulate the vein.  A J-wire was advanced, serial dilation was performed and a peel-away introducer was placed.  We then placed a 23 cm palindrome tunneled dialysis catheter under fluoroscopy.  We positioned the catheter tip just inside the right atrium.  Images were retained.  We reversed tunneled the catheter onto the right chest wall, transected.  The appropriate collar and hub were placed.  Both lumens were aspirated and flushed with heparin saline solution and then locked with 1000 unit/cc heparin solution.  Appropriate caps were placed.  The catheter was affixed to the chest wall with 2-0 silk sutures.  The jugular access site in the neck was closed with 4-0 Vicryl suture.  Dermabond dressing was utilized to dress this site and then  chlorhexidine impregnated Tegaderm was utilized to dress the catheter exit site.  The right arm was then prepped and draped.  B-mode ultrasound was utilized to evaluate the fistula, and a segment of fistula just proximal to the aneurysmal area was selected and marked and another 1 just distal to the aneurysmal area was selected and marked.  An incision was made at each site.  We were able to expose the fistula and control it with vessel loops.  Heparin was administered.  Then 2-0 silk ties were utilized to ligate the aneurysmal segment of the fistula.  We were able to ligate proximal and distal to the aneurysmal segment by ligating the fistula through each of our incisions.  We also tunneled in the subcutaneous tissues a new tract and placed a 4 mm, 7 mm standard wall Nathrop-Marco Antonio graft.  The fistula was transected proximally.  An end-to-end anastomosis was made with Nathrop-Marco Antonio CV6 suture.  Similarly at the distal fistula, it was transected and an end-to-end anastomosis was performed with additional Nathrop-Marco Antonio CV6 suture.  Just prior to completing the distal anastomosis, we did forward bleed and back bleed the system.  We completed the anastomosis, allowed flow through the system.  3-0 Vicryl suture was used to close the subcutaneous tissues.  A 4-0 Monocryl was utilized to reapproximate the skin and Dermabond dressing was placed.  There was a good thrill through the graft and this completed our procedure.        ______________________________  MD PAVEL Ramirez/UB  DD:  05/18/2020  Time:  04:30PM  DT:  05/18/2020  Time:  04:46PM  Job #:  [id number]